# Patient Record
Sex: FEMALE
[De-identification: names, ages, dates, MRNs, and addresses within clinical notes are randomized per-mention and may not be internally consistent; named-entity substitution may affect disease eponyms.]

---

## 2018-09-14 ENCOUNTER — APPOINTMENT (OUTPATIENT)
Dept: OBGYN | Facility: CLINIC | Age: 28
End: 2018-09-14
Payer: COMMERCIAL

## 2018-09-14 VITALS
HEIGHT: 63.25 IN | WEIGHT: 120 LBS | SYSTOLIC BLOOD PRESSURE: 120 MMHG | DIASTOLIC BLOOD PRESSURE: 60 MMHG | BODY MASS INDEX: 21 KG/M2

## 2018-09-14 DIAGNOSIS — Z01.419 ENCOUNTER FOR GYNECOLOGICAL EXAMINATION (GENERAL) (ROUTINE) W/OUT ABNORMAL FINDINGS: ICD-10-CM

## 2018-09-14 PROCEDURE — 99395 PREV VISIT EST AGE 18-39: CPT

## 2018-09-17 LAB
C TRACH RRNA SPEC QL NAA+PROBE: NOT DETECTED
N GONORRHOEA RRNA SPEC QL NAA+PROBE: NOT DETECTED
SOURCE TP AMPLIFICATION: NORMAL

## 2018-09-18 LAB — CYTOLOGY CVX/VAG DOC THIN PREP: NORMAL

## 2020-08-03 ENCOUNTER — APPOINTMENT (OUTPATIENT)
Dept: OBGYN | Facility: CLINIC | Age: 30
End: 2020-08-03
Payer: COMMERCIAL

## 2020-08-03 VITALS — SYSTOLIC BLOOD PRESSURE: 114 MMHG | DIASTOLIC BLOOD PRESSURE: 70 MMHG

## 2020-08-03 DIAGNOSIS — N93.9 ABNORMAL UTERINE AND VAGINAL BLEEDING, UNSPECIFIED: ICD-10-CM

## 2020-08-03 LAB
HCG UR QL: NEGATIVE
QUALITY CONTROL: YES

## 2020-08-03 PROCEDURE — 81025 URINE PREGNANCY TEST: CPT

## 2020-08-03 PROCEDURE — 99213 OFFICE O/P EST LOW 20 MIN: CPT

## 2020-08-03 NOTE — PHYSICAL EXAM
[Labia Majora] : labia major [Normal] : clitoris [Labia Minora] : labia minora [No Bleeding] : there was no active vaginal bleeding [Polyp ___ cm] : had no polyp

## 2020-08-03 NOTE — HISTORY OF PRESENT ILLNESS
[Last Pap ___] : Last cervical pap smear was [unfilled] [Contraception] : uses contraception [Condoms] : uses condoms

## 2020-08-05 LAB
C TRACH RRNA SPEC QL NAA+PROBE: NOT DETECTED
CANDIDA VAG CYTO: NOT DETECTED
G VAGINALIS+PREV SP MTYP VAG QL MICRO: DETECTED
N GONORRHOEA RRNA SPEC QL NAA+PROBE: NOT DETECTED
SOURCE AMPLIFICATION: NORMAL
T VAGINALIS VAG QL WET PREP: NOT DETECTED

## 2020-09-03 ENCOUNTER — APPOINTMENT (OUTPATIENT)
Dept: OBGYN | Facility: CLINIC | Age: 30
End: 2020-09-03
Payer: COMMERCIAL

## 2020-09-03 VITALS
BODY MASS INDEX: 22.15 KG/M2 | RESPIRATION RATE: 16 BRPM | DIASTOLIC BLOOD PRESSURE: 62 MMHG | SYSTOLIC BLOOD PRESSURE: 106 MMHG | HEIGHT: 63 IN | WEIGHT: 125 LBS | TEMPERATURE: 97.2 F

## 2020-09-03 DIAGNOSIS — N94.4 PRIMARY DYSMENORRHEA: ICD-10-CM

## 2020-09-03 PROCEDURE — 99395 PREV VISIT EST AGE 18-39: CPT

## 2020-09-03 NOTE — PHYSICAL EXAM
[Awake] : awake [Alert] : alert [Acute Distress] : no acute distress [Nipple Discharge] : no nipple discharge [Mass] : no breast mass [Axillary LAD] : no axillary lymphadenopathy [Soft] : soft [Tender] : non tender [Oriented x3] : oriented to person, place, and time [Normal] : vagina [No Bleeding] : there was no active vaginal bleeding [Uterine Adnexae] : were not tender and not enlarged

## 2020-09-03 NOTE — COUNSELING
[Nutrition] : nutrition [Breast Self Exam] : breast self exam [Contraception] : contraception [Exercise] : exercise

## 2020-12-25 ENCOUNTER — TRANSCRIPTION ENCOUNTER (OUTPATIENT)
Age: 30
End: 2020-12-25

## 2021-09-09 ENCOUNTER — APPOINTMENT (OUTPATIENT)
Dept: OBGYN | Facility: CLINIC | Age: 31
End: 2021-09-09

## 2021-10-07 ENCOUNTER — APPOINTMENT (OUTPATIENT)
Dept: OBGYN | Facility: CLINIC | Age: 31
End: 2021-10-07
Payer: COMMERCIAL

## 2021-10-07 ENCOUNTER — NON-APPOINTMENT (OUTPATIENT)
Age: 31
End: 2021-10-07

## 2021-10-07 VITALS
DIASTOLIC BLOOD PRESSURE: 60 MMHG | SYSTOLIC BLOOD PRESSURE: 100 MMHG | WEIGHT: 124 LBS | BODY MASS INDEX: 21.97 KG/M2 | HEART RATE: 76 BPM | HEIGHT: 63 IN | RESPIRATION RATE: 14 BRPM

## 2021-10-07 DIAGNOSIS — Z01.419 ENCOUNTER FOR GYNECOLOGICAL EXAMINATION (GENERAL) (ROUTINE) W/OUT ABNORMAL FINDINGS: ICD-10-CM

## 2021-10-07 DIAGNOSIS — N92.1 EXCESSIVE AND FREQUENT MENSTRUATION WITH IRREGULAR CYCLE: ICD-10-CM

## 2021-10-07 PROCEDURE — 99395 PREV VISIT EST AGE 18-39: CPT

## 2021-10-07 NOTE — PLAN
[FreeTextEntry1] : PATIENT PRESENTS FOR ANNUAL VISIT. PATIENT DOING WELL.. SELF BREAST EXAM RECOMMENDED. DIET AND EXERCISE DISCUSSED. PATIENT ADVISED TO CONTACT WITH ANY CHANGES IN HEALTH. CONTRACEPTION NEEDS DISCUSSED.

## 2021-11-12 ENCOUNTER — TRANSCRIPTION ENCOUNTER (OUTPATIENT)
Age: 31
End: 2021-11-12

## 2021-11-22 ENCOUNTER — APPOINTMENT (OUTPATIENT)
Dept: OPHTHALMOLOGY | Facility: CLINIC | Age: 31
End: 2021-11-22
Payer: MEDICARE

## 2021-11-22 ENCOUNTER — NON-APPOINTMENT (OUTPATIENT)
Age: 31
End: 2021-11-22

## 2021-11-22 PROCEDURE — 92014 COMPRE OPH EXAM EST PT 1/>: CPT

## 2022-01-04 ENCOUNTER — TRANSCRIPTION ENCOUNTER (OUTPATIENT)
Age: 32
End: 2022-01-04

## 2022-03-22 ENCOUNTER — TRANSCRIPTION ENCOUNTER (OUTPATIENT)
Age: 32
End: 2022-03-22

## 2022-07-28 ENCOUNTER — APPOINTMENT (OUTPATIENT)
Dept: OBGYN | Facility: CLINIC | Age: 32
End: 2022-07-28

## 2022-07-28 VITALS
WEIGHT: 125 LBS | SYSTOLIC BLOOD PRESSURE: 110 MMHG | DIASTOLIC BLOOD PRESSURE: 60 MMHG | HEIGHT: 63 IN | BODY MASS INDEX: 22.15 KG/M2

## 2022-07-28 DIAGNOSIS — N89.8 OTHER SPECIFIED NONINFLAMMATORY DISORDERS OF VAGINA: ICD-10-CM

## 2022-07-28 PROCEDURE — 99072 ADDL SUPL MATRL&STAF TM PHE: CPT

## 2022-07-28 PROCEDURE — 99213 OFFICE O/P EST LOW 20 MIN: CPT

## 2022-08-05 ENCOUNTER — APPOINTMENT (OUTPATIENT)
Dept: OBGYN | Facility: CLINIC | Age: 32
End: 2022-08-05

## 2022-10-13 ENCOUNTER — APPOINTMENT (OUTPATIENT)
Dept: OBGYN | Facility: CLINIC | Age: 32
End: 2022-10-13

## 2022-10-13 VITALS
HEIGHT: 63 IN | WEIGHT: 128 LBS | HEART RATE: 75 BPM | DIASTOLIC BLOOD PRESSURE: 70 MMHG | SYSTOLIC BLOOD PRESSURE: 120 MMHG | BODY MASS INDEX: 22.68 KG/M2 | RESPIRATION RATE: 15 BRPM

## 2022-10-13 DIAGNOSIS — Z01.419 ENCOUNTER FOR GYNECOLOGICAL EXAMINATION (GENERAL) (ROUTINE) W/OUT ABNORMAL FINDINGS: ICD-10-CM

## 2022-10-13 PROCEDURE — 99395 PREV VISIT EST AGE 18-39: CPT

## 2022-10-13 NOTE — PLAN
[FreeTextEntry1] : PATIENT PRESENTS FOR ANNUAL VISIT. PATIENT DOING WELL.. SELF BREAST EXAM RECOMMENDED. DIET AND EXERCISE DISCUSSED. PATIENT ADVISED TO CONTACT WITH ANY CHANGES IN HEALTH. CONTRACEPTION DISCUSSED. CONDOMS FOR BIRTH CONTROL

## 2022-10-18 LAB — CYTOLOGY CVX/VAG DOC THIN PREP: ABNORMAL

## 2022-11-02 ENCOUNTER — NON-APPOINTMENT (OUTPATIENT)
Age: 32
End: 2022-11-02

## 2023-12-21 ENCOUNTER — APPOINTMENT (OUTPATIENT)
Dept: OBGYN | Facility: CLINIC | Age: 33
End: 2023-12-21
Payer: MEDICARE

## 2023-12-21 VITALS
DIASTOLIC BLOOD PRESSURE: 58 MMHG | WEIGHT: 127 LBS | BODY MASS INDEX: 22.5 KG/M2 | SYSTOLIC BLOOD PRESSURE: 114 MMHG | HEIGHT: 63 IN

## 2023-12-21 DIAGNOSIS — Z00.00 ENCOUNTER FOR GENERAL ADULT MEDICAL EXAMINATION W/OUT ABNORMAL FINDINGS: ICD-10-CM

## 2023-12-21 PROCEDURE — 99395 PREV VISIT EST AGE 18-39: CPT

## 2023-12-21 RX ORDER — CHROMIUM 200 MCG
TABLET ORAL
Refills: 0 | Status: ACTIVE | COMMUNITY

## 2023-12-21 RX ORDER — IMIQUIMOD 5% / LEVOCETIRIZINE DIHYDROCHLORIDE 1% / TRETINOIN 0.05% .05; 1; 5 G/100G; G/100G; G/100G
5-1-0.05 GEL TOPICAL
Refills: 0 | Status: DISCONTINUED | COMMUNITY
End: 2023-12-21

## 2023-12-21 RX ORDER — MELOXICAM 15 MG/1
15 TABLET ORAL
Refills: 0 | Status: DISCONTINUED | COMMUNITY
End: 2023-12-21

## 2023-12-21 RX ORDER — METRONIDAZOLE 7.5 MG/G
0.75 GEL VAGINAL
Qty: 5 | Refills: 0 | Status: DISCONTINUED | COMMUNITY
Start: 2020-08-05 | End: 2023-12-21

## 2023-12-23 LAB — HPV HIGH+LOW RISK DNA PNL CVX: NOT DETECTED

## 2023-12-27 LAB — CYTOLOGY CVX/VAG DOC THIN PREP: ABNORMAL

## 2023-12-27 NOTE — PLAN
[FreeTextEntry1] : 32 YO F PRESENTS ANNUAL EXAMINATION.  CLINICAL BREAST EXAM WNL BIMANUAL EXAM WNL- PT HAD DIFFICULTY TOLERATING PAP  DISCUSSED CHANGES IN MENSES- ADVISED TO MONITOR PERIODS AND WILL DISCUSS MANAGEMENT OPTIONS IF CRAMPS CONTINUE TO WORSEN/BECOME MORE BOTHERSOME. PT UNDERSTANDS AND AGREES WITH PLAN ALL QUESTIONS ANSWERED F/U IN 1 YR OR PRN.

## 2023-12-27 NOTE — HISTORY OF PRESENT ILLNESS
[FreeTextEntry1] : 32 YO F PRESENTS FOR ANNUAL VISIT. PT STATES HER PERIODS HAVE BEEN COMING REGULARLY BUT SHE IS NOTICING A BIT MORE CRAMPING NOW THAN PRIOR. CRAMPING NOT CONSISTENT AND DENIES INCREASED FLOW OF MENSES OR PASSING CLOTS.   LPAP 10/13/22: NORMAL

## 2024-07-22 ENCOUNTER — APPOINTMENT (OUTPATIENT)
Dept: OBGYN | Facility: CLINIC | Age: 34
End: 2024-07-22
Payer: COMMERCIAL

## 2024-07-22 VITALS
WEIGHT: 125 LBS | BODY MASS INDEX: 22.15 KG/M2 | SYSTOLIC BLOOD PRESSURE: 116 MMHG | RESPIRATION RATE: 16 BRPM | HEIGHT: 63 IN | DIASTOLIC BLOOD PRESSURE: 68 MMHG

## 2024-07-22 DIAGNOSIS — N64.52 NIPPLE DISCHARGE: ICD-10-CM

## 2024-07-22 PROCEDURE — 99214 OFFICE O/P EST MOD 30 MIN: CPT

## 2024-07-22 PROCEDURE — 36415 COLL VENOUS BLD VENIPUNCTURE: CPT

## 2024-07-22 NOTE — PHYSICAL EXAM
[Awake] : awake [Alert] : alert [Acute Distress] : no acute distress [LAD] : no lymphadenopathy [Thyroid Nodule] : no thyroid nodule [Goiter] : no goiter [Mass] : no breast mass [Tender] : no tenderness [Nipple Discharge] : no nipple discharge

## 2024-07-22 NOTE — CHIEF COMPLAINT
[FreeTextEntry1] : Aby is a 33 y/o G0 who presents today with c/o noticing crusting at her right nipple. She states she first noticed it about 2 years ago, she ignored it and it continued intermittently.  She reports regular menses and is not using any prescriiption medicaiton.

## 2024-07-23 DIAGNOSIS — R79.89 OTHER SPECIFIED ABNORMAL FINDINGS OF BLOOD CHEMISTRY: ICD-10-CM

## 2024-07-23 LAB — PROLACTIN SERPL-MCNC: 31 NG/ML

## 2024-07-28 ENCOUNTER — NON-APPOINTMENT (OUTPATIENT)
Age: 34
End: 2024-07-28

## 2024-07-29 LAB — PROLACTIN SERPL-MCNC: 73.8 NG/ML

## 2024-08-01 ENCOUNTER — NON-APPOINTMENT (OUTPATIENT)
Age: 34
End: 2024-08-01

## 2024-08-01 DIAGNOSIS — Z12.39 ENCOUNTER FOR OTHER SCREENING FOR MALIGNANT NEOPLASM OF BREAST: ICD-10-CM

## 2024-11-06 ENCOUNTER — NON-APPOINTMENT (OUTPATIENT)
Age: 34
End: 2024-11-06

## 2024-11-06 DIAGNOSIS — D35.2 BENIGN NEOPLASM OF PITUITARY GLAND: ICD-10-CM

## 2024-11-08 PROBLEM — D35.2 PITUITARY ADENOMA: Status: ACTIVE | Noted: 2024-11-08

## 2024-11-09 ENCOUNTER — NON-APPOINTMENT (OUTPATIENT)
Age: 34
End: 2024-11-09

## 2024-12-03 ENCOUNTER — APPOINTMENT (OUTPATIENT)
Dept: ENDOCRINOLOGY | Facility: CLINIC | Age: 34
End: 2024-12-03
Payer: COMMERCIAL

## 2024-12-03 VITALS
HEART RATE: 79 BPM | BODY MASS INDEX: 21.79 KG/M2 | WEIGHT: 123 LBS | RESPIRATION RATE: 18 BRPM | HEIGHT: 63 IN | TEMPERATURE: 98.1 F | DIASTOLIC BLOOD PRESSURE: 59 MMHG | SYSTOLIC BLOOD PRESSURE: 114 MMHG | OXYGEN SATURATION: 100 %

## 2024-12-03 DIAGNOSIS — E22.1 HYPERPROLACTINEMIA: ICD-10-CM

## 2024-12-03 DIAGNOSIS — D35.2 BENIGN NEOPLASM OF PITUITARY GLAND: ICD-10-CM

## 2024-12-03 PROCEDURE — 99204 OFFICE O/P NEW MOD 45 MIN: CPT

## 2024-12-04 PROBLEM — E22.1 HYPERPROLACTINEMIA: Status: ACTIVE | Noted: 2024-12-04

## 2024-12-04 RX ORDER — CABERGOLINE 0.5 MG/1
0.5 TABLET ORAL
Qty: 7 | Refills: 3 | Status: ACTIVE | COMMUNITY
Start: 2024-12-04 | End: 1900-01-01

## 2025-01-23 ENCOUNTER — NON-APPOINTMENT (OUTPATIENT)
Age: 35
End: 2025-01-23

## 2025-01-23 ENCOUNTER — APPOINTMENT (OUTPATIENT)
Dept: OBGYN | Facility: CLINIC | Age: 35
End: 2025-01-23
Payer: COMMERCIAL

## 2025-01-23 VITALS
BODY MASS INDEX: 21.62 KG/M2 | HEIGHT: 63 IN | SYSTOLIC BLOOD PRESSURE: 118 MMHG | DIASTOLIC BLOOD PRESSURE: 70 MMHG | WEIGHT: 122 LBS

## 2025-01-23 DIAGNOSIS — Z01.419 ENCOUNTER FOR GYNECOLOGICAL EXAMINATION (GENERAL) (ROUTINE) W/OUT ABNORMAL FINDINGS: ICD-10-CM

## 2025-01-23 PROCEDURE — 99395 PREV VISIT EST AGE 18-39: CPT

## 2025-01-30 LAB — CYTOLOGY CVX/VAG DOC THIN PREP: NORMAL

## 2025-03-19 LAB
ANION GAP SERPL CALC-SCNC: 12 MMOL/L
BUN SERPL-MCNC: 18 MG/DL
CALCIUM SERPL-MCNC: 9.5 MG/DL
CHLORIDE SERPL-SCNC: 105 MMOL/L
CO2 SERPL-SCNC: 26 MMOL/L
CREAT SERPL-MCNC: 0.85 MG/DL
EGFRCR SERPLBLD CKD-EPI 2021: 93 ML/MIN/1.73M2
GLUCOSE SERPL-MCNC: 85 MG/DL
POTASSIUM SERPL-SCNC: 4.1 MMOL/L
PROLACTIN SERPL-MCNC: 33.9 NG/ML
SODIUM SERPL-SCNC: 142 MMOL/L
T4 FREE SERPL-MCNC: 1.3 NG/DL
TSH SERPL-ACNC: 2.01 UIU/ML

## 2025-08-09 ENCOUNTER — APPOINTMENT (OUTPATIENT)
Dept: ENDOCRINOLOGY | Facility: CLINIC | Age: 35
End: 2025-08-09
Payer: COMMERCIAL

## 2025-08-09 VITALS
SYSTOLIC BLOOD PRESSURE: 118 MMHG | WEIGHT: 124 LBS | BODY MASS INDEX: 21.97 KG/M2 | HEART RATE: 81 BPM | DIASTOLIC BLOOD PRESSURE: 76 MMHG | OXYGEN SATURATION: 98 % | HEIGHT: 63 IN

## 2025-08-09 DIAGNOSIS — D35.2 BENIGN NEOPLASM OF PITUITARY GLAND: ICD-10-CM

## 2025-08-09 LAB
ANION GAP SERPL CALC-SCNC: 14 MMOL/L
BUN SERPL-MCNC: 24 MG/DL
CALCIUM SERPL-MCNC: 9.7 MG/DL
CHLORIDE SERPL-SCNC: 104 MMOL/L
CO2 SERPL-SCNC: 22 MMOL/L
CREAT SERPL-MCNC: 0.92 MG/DL
EGFRCR SERPLBLD CKD-EPI 2021: 84 ML/MIN/1.73M2
GLUCOSE SERPL-MCNC: 80 MG/DL
POTASSIUM SERPL-SCNC: 4.5 MMOL/L
PROLACTIN SERPL-MCNC: 1.7 NG/ML
SODIUM SERPL-SCNC: 140 MMOL/L
T4 FREE SERPL-MCNC: 1.2 NG/DL
TSH SERPL-ACNC: 2.46 UIU/ML

## 2025-08-09 PROCEDURE — 99214 OFFICE O/P EST MOD 30 MIN: CPT

## 2025-08-09 PROCEDURE — G2211 COMPLEX E/M VISIT ADD ON: CPT | Mod: NC
